# Patient Record
Sex: MALE | URBAN - METROPOLITAN AREA
[De-identification: names, ages, dates, MRNs, and addresses within clinical notes are randomized per-mention and may not be internally consistent; named-entity substitution may affect disease eponyms.]

---

## 2021-04-23 ENCOUNTER — AMBULATORY - HEALTHEAST (OUTPATIENT)
Dept: NURSING | Facility: CLINIC | Age: 36
End: 2021-04-23

## 2021-05-14 ENCOUNTER — AMBULATORY - HEALTHEAST (OUTPATIENT)
Dept: NURSING | Facility: CLINIC | Age: 36
End: 2021-05-14

## 2025-06-18 ENCOUNTER — HOSPITAL ENCOUNTER (EMERGENCY)
Facility: HOSPITAL | Age: 40
Discharge: HOME | End: 2025-06-18
Attending: EMERGENCY MEDICINE | Admitting: EMERGENCY MEDICINE
Payer: MEDICAID

## 2025-06-18 VITALS
BODY MASS INDEX: 39.71 KG/M2 | HEART RATE: 98 BPM | RESPIRATION RATE: 18 BRPM | TEMPERATURE: 97.8 F | OXYGEN SATURATION: 95 % | HEIGHT: 68 IN | SYSTOLIC BLOOD PRESSURE: 127 MMHG | WEIGHT: 262 LBS | DIASTOLIC BLOOD PRESSURE: 84 MMHG

## 2025-06-18 DIAGNOSIS — G51.0 BELL'S PALSY: ICD-10-CM

## 2025-06-18 PROCEDURE — 99282 EMERGENCY DEPT VISIT SF MDM: CPT | Performed by: EMERGENCY MEDICINE

## 2025-06-18 ASSESSMENT — COLUMBIA-SUICIDE SEVERITY RATING SCALE - C-SSRS
2. HAVE YOU ACTUALLY HAD ANY THOUGHTS OF KILLING YOURSELF IN THE PAST MONTH?: NO
6. HAVE YOU EVER DONE ANYTHING, STARTED TO DO ANYTHING, OR PREPARED TO DO ANYTHING TO END YOUR LIFE?: NO
1. IN THE PAST MONTH, HAVE YOU WISHED YOU WERE DEAD OR WISHED YOU COULD GO TO SLEEP AND NOT WAKE UP?: NO

## 2025-06-18 ASSESSMENT — ACTIVITIES OF DAILY LIVING (ADL): ADLS_ACUITY_SCORE: 43

## 2025-06-18 NOTE — ED PROVIDER NOTES
EMERGENCY DEPARTMENT ENCOUNTER     NAME: Alex Ruiz   AGE: 40 year old male   YOB: 1985   MRN: [id number]   EVALUATION DATE & TIME: 6/18/2025 10:29 AM   PCP: Rachel Arias     Chief Complaint   Patient presents with    Facial Droop   :    FINAL IMPRESSION       1. Bell's palsy           ED COURSE & MEDICAL DECISION MAKING      Pertinent Labs & Imaging studies reviewed. (See chart for details)   40 year old male  presents to the Emergency Department for evaluation of reevaluation of right facial droop that been present now on day 5.  He was seen at urgency room 5 days ago, diagnosed with Bell's palsy, and was prescribed prednisone and Valtrex.  He notes that he had difficulty when he was shaving today, but when you delineate this further it is all because his face feels weird and is numb and he actually denies any other neurologic symptoms specifically denying any numbness or weakness in upper extremities.  On physical exam, he has right sided facial droop that does not spare the forehead, consistent with a diagnosis of Bell's palsy and no other neurologic symptoms.  I did a lot of education and reassurance with patient and his mother and it sounds like he has not been following the recommendations to use artificial tears or tape the eye shut for sleeping and I encouraged him to do this.  They feel reassured and are comfortable with discharge with return precautions.  I did specifically discuss with them and reassured that this is not consistent with CVA and does not require ED imaging.  Initial Vitals Reviewed.            At the conclusion of the encounter I discussed the results of all of the tests and the disposition. The questions were answered. The patient or family acknowledged understanding and was agreeable with the care plan.     0 minutes critical care time, see procedure note below for details if relevant    Medical Decision Making  I obtained history from Family Member/Significant  Other  I reviewed the EMR: Outpatient Record: [facility] record 6/14/25  Care impacted by Mental Health  Discharge. I recommended the patient continue their current prescription strength medication(s): valtrex, prednisone. See documentation for any additional details.    MIPS (CTPE, Dental pain, Guerrero, Sinusitis, Asthma/COPD, Head Trauma): Not Applicable    SEPSIS: None                      MEDICATIONS GIVEN IN THE EMERGENCY:   Medications - No data to display   NEW PRESCRIPTIONS STARTED AT TODAY'S ER VISIT   New Prescriptions    No medications on file     ================================================================   HISTORY OF PRESENT ILLNESS       Patient information was obtained from: The patient and patient's aunt  Use of Intrepreter: N/A    Alex Ruiz is a 40 year old male with history of schizoaffective schizophrenia and bell's palsy who presents for facial droop.    The patient has been experiencing right sided facial droop since 6/14 (4 days ago). He endorses being seen that day at the urgency room and being prescribed medication. He has been taking the medication as prescribed, but has not been taping his eye shut at night as he was instructed. The patient notes he had trouble shaving this morning, but that was primarily due to his facial numbness. He has an appointment with primary care tomorrow.    Per chart review, the patient was seen at [facility] on 6/14/25 for facial weakness. Patient was prescribed valtrex and prednisone. He was discharged in stable condition the same day.    ================================================================        PAST HISTORY     PAST MEDICAL HISTORY:   No past medical history on file.   PAST SURGICAL HISTORY:   No past surgical history on file.   CURRENT MEDICATIONS:   No current outpatient medications on file.    ALLERGIES:   No Known Allergies   FAMILY HISTORY:   No family history on file.   SOCIAL HISTORY:   Social History  "    Socioeconomic History    Marital status: Single        VITALS  Patient Vitals for the past 24 hrs:   BP Temp Temp src Pulse Resp SpO2 Height Weight   06/18/25 1025 (!) 145/87 97.8  F (36.6  C) Temporal 104 18 97 % 1.727 m (5' 8\") 118.8 kg (262 lb)        ================================================================    PHYSICAL EXAM     VITAL SIGNS: BP (!) 145/87   Pulse 104   Temp 97.8  F (36.6  C) (Temporal)   Resp 18   Ht 1.727 m (5' 8\")   Wt 118.8 kg (262 lb)   SpO2 97%   BMI 39.84 kg/m     Constitutional:  Awake, no acute distress   HENT:  Atraumatic, oropharynx without exudate or erythema, membranes moist  Lymph:  No adenopathy  Eyes: EOM intact, PERRL, no injection  Neck: Supple  Respiratory:  Clear to auscultation bilaterally, no wheezes or crackles   Cardiovascular:  Regular rate and rhythm, single S1 and S2   GI:  Soft, nontender, nondistended, no rebound or guarding   Musculoskeletal:  Moves all extremities, no lower extremity edema, no deformities    Skin:  Warm, dry  Neurologic:  Alert and oriented x3, right sided facial droop that does not spare the forehead and is present in all divisions of cranial nerve VII, no other neurological deficits, no numbness or weakness of upper extremities, normal strength and sensation, gait intact      ================================================================  LAB       All pertinent labs reviewed and interpreted.   Labs Ordered and Resulted from Time of ED Arrival to Time of ED Departure - No data to display     ===============================================================  RADIOLOGY       Reviewed all pertinent imaging. Please see official radiology report.   No orders to display         ================================================================  EKG         I have independently reviewed and interpreted the EKG(s) documented above.     ================================================================  PROCEDURES         I, Sumanth Rich, am " serving as a scribe to document services personally performed by Dr. Lopez based on my observation and the provider's statements to me. I, Lu Lopez MD attest that Sumanth Rich is acting in a scribe capacity, has observed my performance of the services and has documented them in accordance with my direction.   Lu Lopez M.D.   Emergency Medicine   [facility] EMERGENCY DEPARTMENT  [address] [place] [id number]  [phone]  Dept: [phone]      Lu Lopez MD  06/18/25 [id number]

## 2025-06-18 NOTE — ED TRIAGE NOTES
"Pt arrives to department ambulatory from home with aunt.    Pt story: Pt reports right sided facial droop since about 1045 on Saturday. Pt seen at urgent care that day and diagnosed with Bell's Palsy. Given prescription for Valtrex and prednisone. Pt has been taking these since then. Pt and aunt here due to concerns for stroke. Of note, pt has pain behind right ear that started about 1 week prior to symptoms. No unilateral weakness or sensation loss in extremities noted in triage. Pt A/Ox4.    BP (!) 145/87   Pulse 104   Temp 97.8  F (36.6  C) (Temporal)   Resp 18   Ht 1.727 m (5' 8\")   Wt 118.8 kg (262 lb)   SpO2 97%   BMI 39.84 kg/m       Pt oriented to department, standard department flow, and updated on immediate plan of care. Questions answered.         "

## 2025-06-28 ENCOUNTER — HEALTH MAINTENANCE LETTER (OUTPATIENT)
Age: 40
End: 2025-06-28

## 2025-07-19 ENCOUNTER — HEALTH MAINTENANCE LETTER (OUTPATIENT)
Age: 40
End: 2025-07-19